# Patient Record
(demographics unavailable — no encounter records)

---

## 2024-10-07 NOTE — BIRTH HISTORY
[At Term] : at term [Normal Vaginal Route] : by normal vaginal route [Age Appropriate] : age appropriate developmental milestones not met [Speech & Motor Delay] : patient has speech and motor delay  [Physical Therapy] : physical therapy

## 2024-10-07 NOTE — CONSULT LETTER
[Dear  ___] : Dear ~EMILY, [Consult Letter:] : I had the pleasure of evaluating your patient, [unfilled]. [Please see my note below.] : Please see my note below. [Consult Closing:] : Thank you very much for allowing me to participate in the care of this patient.  If you have any questions, please do not hesitate to contact me. [Sincerely,] : Sincerely, [FreeTextEntry1] : Dear AMADEO VU , \par  \par  I had the pleasure of seeing your patient, ROCKY SCHILLING, in my office today.  Please see my note below.\par  \par  Thank you very much for allowing me to participate in the care of this patient. If you have any questions, please do not hesitate to contact me.\par  \par  Sincerely, \par  \par  Md Homa Stoll \par  , Pediatric Nephrology\par  \par  [facility]\par   [FreeTextEntry3] : Suzy kolb MD

## 2024-10-07 NOTE — PHYSICAL EXAM
[de-identified] : BiPAP mask on face during exam. Patient appears thin.  [de-identified] : Lung sounds clear [de-identified] : GJ tube site clean/dry/intact [de-identified] : Contractures noted of the wrists. Hypertonia noted of all the upper extremities. Normal strength.

## 2024-10-07 NOTE — END OF VISIT
[Time Spent: ___ minutes] : I have spent [unfilled] minutes of face to face time with the patient [FreeTextEntry1] : cortes

## 2024-10-07 NOTE — REASON FOR VISIT
[Follow-Up] : a follow-up visit for [FreeTextEntry3] : Hyperkalemia [Mother] : mother [Father] : father

## 2024-11-01 NOTE — CONSULT LETTER
[Dear  ___] : Dear  [unfilled], [Consult Letter:] : I had the pleasure of evaluating your patient, [unfilled]. [Please see my note below.] : Please see my note below. [Consult Closing:] : Thank you very much for allowing me to participate in the care of this patient.  If you have any questions, please do not hesitate to contact me. [Sincerely,] : Sincerely, [FreeTextEntry3] : Mandi Willett, RN, MSN, CPNP  Certified Pediatric Nurse Practitioner

## 2024-11-01 NOTE — REASON FOR VISIT
[Consultation Follow Up] : a consultation follow up  [Medical Records] : medical records [Home] : at home, [unfilled] , at the time of the visit. [Medical Office: ([facility])___] : at the medical office located in  [Mother] : mother [FreeTextEntry3] : Mother

## 2024-11-01 NOTE — REVIEW OF SYSTEMS
[Negative] : Skin [Immunizations are up to date] : Immunizations are up to date [FreeTextEntry3] : optic atrophy [FreeTextEntry4] : sialorrhea  [FreeTextEntry6] : vent dependent  [FreeTextEntry9] : wheelchair dependent  [FreeTextEntry8] : neurogenic bladder [de-identified] : agitation at times [de-identified] : seizure disorder; dissencephaly

## 2024-11-01 NOTE — PHYSICAL EXAM
[Well Developed] : well developed [Well Nourished] : well nourished [NAD] : in no acute distress [Feeding Tube] : There was a feeding tube  [___F] : [unfilled] F [___cm] : [unfilled] cm [Clean] : clean [Dry] : dry [Focal Deficits] : focal deficits [Well-Perfused] : well-perfused [icteric] : anicteric [Respiratory Distress] : no respiratory distress  [Erythema] : no erythema [Granulation Tissue] : no granulation tissue [Verbal] : non verbal [FreeTextEntry1] : developmental delay; well groomed sitting on mother lap [FreeTextEntry4] : on BiPAP [FreeTextEntry2] : Yazan LOBO [de-identified] : mother palpated stomach - soft non distended, non tender  [de-identified] : baseline behavior

## 2024-11-01 NOTE — HISTORY OF PRESENT ILLNESS
[de-identified] : Branden is a 19-year-old male with history of developmental delay, lissencephaly, chronic respiratory failure/ noninvasive vent dependence (BIPAP), sialorrhea, dysphagia, GERD, recurrent PNA (last 2014), seen for Telehealth follow up visit for management of GJ-tube feeds. Last seen via telehealth on 4/17/24.   FUV 10/30/24 via telehealth Weight: 132.8kg  labs 10/15/24- CBC, prolactin, CMP, Vitamin D  Family plans to move to [facility] and establish care with adult GI there. [facility] in [facility].  Seizures are under good control. Continues on BiPAP: at night, will occasionally need to be put on during the day if having a seizure or agitation.   GJ- exchanged September 2024, no issues with the tube, exchanging in IR every 6 months (16Fr, 2.7cm)  JT feeding regimen in as stated in OS[address] Menezes RD most recent chart note dated 10/9/24.  Formula was changed to Renastap (ordered by nephrology) + Breana ArthroCAD Adult in order to help with the renal stones as discussed with Dr Mcmillan. Fluid goal of 2,200mL/daily. He is NPO. Remains on Daily Miralax which she is giving 10-10.5 grams daily (measures daily on a scale). BM's are 1-2 times daily soft without straining. Appropriate urine output. Denies, emesis, abdominal pain, ERROL, abdominal distention, fever, rashes and diarrhea.   In July mother noted increased brown drainage from G portion of GJ tube.  Switched PPI to Lansoprazole (giving 9mL 3 times/day) and started Famotidine 20 mg twice/day. No longer seeing brown drainage. No vomiting or ERROL.  October 2023: attempted to wean PPI but when weaning noticed increased brown drainage from GT He remains on liquid Omeprazole, slowly weaning the dose without difficulty he is now getting 9.2 mL, 9.2mL, and 9.2 mL, for a daily total of 28mL (~56mg/day). No longer taking Vit D3 (900 IU daily).   H/X: previously followed at [facility] for the first 5 years of life where a GJ was placed, family then moved to  where care was received at both [place] and [facility]. His GJ is changed with IR every 6 months. Requires TEB only; unable to take pt out of home - seen by pediatrician annually.   DME: [facility] follow up with other subspecialties including neurology, nephrology, pulmonology.

## 2024-11-01 NOTE — REVIEW OF SYSTEMS
[Negative] : Skin [Immunizations are up to date] : Immunizations are up to date [FreeTextEntry3] : optic atrophy [FreeTextEntry4] : sialorrhea  [FreeTextEntry6] : vent dependent  [FreeTextEntry9] : wheelchair dependent  [FreeTextEntry8] : neurogenic bladder [de-identified] : agitation at times [de-identified] : seizure disorder; dissencephaly

## 2024-11-01 NOTE — HISTORY OF PRESENT ILLNESS
[de-identified] : Branden is a 19-year-old male with history of developmental delay, lissencephaly, chronic respiratory failure/ noninvasive vent dependence (BIPAP), sialorrhea, dysphagia, GERD, recurrent PNA (last 2014), seen for Telehealth follow up visit for management of GJ-tube feeds. Last seen via telehealth on 4/17/24.   FUV 10/30/24 via telehealth Weight: 132.8kg  labs 10/15/24- CBC, prolactin, CMP, Vitamin D  Family plans to move to [facility] and establish care with adult GI there. [facility] in [facility].  Seizures are under good control. Continues on BiPAP: at night, will occasionally need to be put on during the day if having a seizure or agitation.   GJ- exchanged September 2024, no issues with the tube, exchanging in IR every 6 months (16Fr, 2.7cm)  JT feeding regimen in as stated in OSVALDO Menezes RD most recent chart note dated 10/9/24.  Formula was changed to Renastap (ordered by nephrology) + Brenaa [employer] Interpreting Adult in order to help with the renal stones as discussed with Dr Mcmillan. Fluid goal of 2,200mL/daily. He is NPO. Remains on Daily Miralax which she is giving 10-10.5 grams daily (measures daily on a scale). BM's are 1-2 times daily soft without straining. Appropriate urine output. Denies, emesis, abdominal pain, ERROL, abdominal distention, fever, rashes and diarrhea.   In July mother noted increased brown drainage from G portion of GJ tube.  Switched PPI to Lansoprazole (giving 9mL 3 times/day) and started Famotidine 20 mg twice/day. No longer seeing brown drainage. No vomiting or ERROL.  October 2023: attempted to wean PPI but when weaning noticed increased brown drainage from GT He remains on liquid Omeprazole, slowly weaning the dose without difficulty he is now getting 9.2 mL, 9.2mL, and 9.2 mL, for a daily total of 28mL (~56mg/day). No longer taking Vit D3 (900 IU daily).   H/X: previously followed at [facility] for the first 5 years of life where a GJ was placed, family then moved to  where care was received at both [place] and [facility]. His GJ is changed with IR every 6 months. Requires TEB only; unable to take pt out of home - seen by pediatrician annually.   DME: [facility] follow up with other subspecialties including neurology, nephrology, pulmonology.

## 2024-11-01 NOTE — ASSESSMENT
[Educated Patient & Family about Diagnosis] : educated the patient and family about the diagnosis [FreeTextEntry1] : 19-year-old male with complex medical history including developmental delay, lissencephaly, chronic respiratory failure/ non invasive vent dependance, sialorrhea, dysphagia, GERD, recurrent pneumonia, who is here today for follow up for his GJ feedings and nutritional care last seen 4/17/24. Due to complexity of care it is difficult to leave the house follows up closely via TEB. Family plans to move to [place] with plans to establish care there with an adult GI. PPI for GERD, and Miralax for constipation. Will renew medications and supplies for the next 2 months until they can establish care there. FUV closely with new provider and other subspecialities in [place]. Family to call with any further questions, concerns or change in clinical status.

## 2024-11-01 NOTE — REASON FOR VISIT
[Consultation Follow Up] : a consultation follow up  [Medical Records] : medical records [Home] : at home, [unfilled] , at the time of the visit. [Medical Office: ([facility])___] : at the medical office located in  [Mother] : mother [FreeTextEntry3] : Mother

## 2025-04-07 NOTE — PHYSICAL EXAM
[de-identified] : BiPAP mask on face during exam. Patient appears thin.  [de-identified] : Lung sounds clear [de-identified] : GJ tube site clean/dry/intact [de-identified] : Contractures noted of the wrists. Hypertonia noted of all the upper extremities. Normal strength.

## 2025-04-07 NOTE — BIRTH HISTORY
[At Term] : at term [Normal Vaginal Route] : by normal vaginal route [Speech & Motor Delay] : patient has speech and motor delay  [Physical Therapy] : physical therapy [Age Appropriate] : age appropriate developmental milestones not met

## 2025-04-07 NOTE — REASON FOR VISIT
[Follow-Up] : a follow-up visit for [Mother] : mother [Father] : father [FreeTextEntry3] : Hyperkalemia

## 2025-04-07 NOTE — CONSULT LETTER
[Dear  ___] : Dear ~EMILY, [Consult Letter:] : I had the pleasure of evaluating your patient, [unfilled]. [Please see my note below.] : Please see my note below. [Consult Closing:] : Thank you very much for allowing me to participate in the care of this patient.  If you have any questions, please do not hesitate to contact me. [Sincerely,] : Sincerely, [FreeTextEntry1] : Dear AMADEO VU , \par  \par  I had the pleasure of seeing your patient, ROCKY SCHILLING, in my office today.  Please see my note below.\par  \par  Thank you very much for allowing me to participate in the care of this patient. If you have any questions, please do not hesitate to contact me.\par  \par  Sincerely, \par  \par  Md Homa Stoll \par  , Pediatric Nephrology\par  \par  [facility]\par   [FreeTextEntry3] : Suzy kolb MD